# Patient Record
Sex: FEMALE
[De-identification: names, ages, dates, MRNs, and addresses within clinical notes are randomized per-mention and may not be internally consistent; named-entity substitution may affect disease eponyms.]

---

## 2021-12-15 ENCOUNTER — NURSE TRIAGE (OUTPATIENT)
Dept: OTHER | Facility: CLINIC | Age: 41
End: 2021-12-15

## 2022-09-05 ENCOUNTER — NURSE TRIAGE (OUTPATIENT)
Dept: OTHER | Facility: CLINIC | Age: 42
End: 2022-09-05

## 2022-09-06 ENCOUNTER — NURSE TRIAGE (OUTPATIENT)
Dept: OTHER | Facility: CLINIC | Age: 42
End: 2022-09-06

## 2022-09-06 NOTE — TELEPHONE ENCOUNTER
Pt calling again wanting information on cold medicine for a head cold. States she took RN advice from last night and reached out to a pharamcist, which was not very helpful. RN did discuss head cold and antihistamines, benadryl etc.     Call was disconnected for unknown reason.      Reason for Disposition   Caller wants to use a complementary or alternative medicine    Protocols used: Medication Question Call-ADULT-

## 2023-10-07 ENCOUNTER — NURSE TRIAGE (OUTPATIENT)
Dept: OTHER | Facility: CLINIC | Age: 43
End: 2023-10-07

## 2023-10-07 NOTE — TELEPHONE ENCOUNTER
Location of patient: [place]    Subjective: Caller states \"I have this like rash on my shoulder. I've had it for like a month\"     Current Symptoms: redness, bleeding with scratchy, states rough feeling, pinky and blotchy, [id number]. States a little itching. One red bump on site    Denies pain, swelling, warm to touch, n/v/d    Onset: 1 month ago;     Associated Symptoms:     Pain Severity: 0/10; ;     Temperature: denies fevers now, states maybe a couple weeks ago for 1 day. What has been tried: cerave- with some relief    LMP:  Pregnant: No    Recommended disposition: See PCP within 3 Days    Care advice provided, patient verbalizes understanding; denies any other questions or concerns; instructed to call back for any new or worsening symptoms. Patient/caller agrees to follow-up with PCP     This triage is a result of a call to [facility]. Please do not respond to the triage nurse through this encounter. Any subsequent communication should be directly with the patient.       Reason for Disposition   Localized rash present > 7 days    Protocols used: Rash or Redness - Localized-ADULT-[place]